# Patient Record
Sex: MALE | URBAN - METROPOLITAN AREA
[De-identification: names, ages, dates, MRNs, and addresses within clinical notes are randomized per-mention and may not be internally consistent; named-entity substitution may affect disease eponyms.]

---

## 2023-03-08 ENCOUNTER — OFFICE VISIT (OUTPATIENT)
Dept: URBAN - METROPOLITAN AREA CLINIC 113 | Facility: CLINIC | Age: 48
End: 2023-03-08

## 2023-03-08 NOTE — HPI-TODAY'S VISIT:
47-year-old male is referred by Dr. Anton Santiago for "GI work-up."  A copy of today's visit will be forwarded to the referring provider. Per the referral note, he does have possible perirectal abscesses.  Drains had been placed by Dr. Webster.  Differential includes cutaneous Crohn's disease versus hidradenitis suppurativa.

## 2023-04-17 ENCOUNTER — DASHBOARD ENCOUNTERS (OUTPATIENT)
Age: 48
End: 2023-04-17

## 2023-04-17 ENCOUNTER — OFFICE VISIT (OUTPATIENT)
Dept: URBAN - METROPOLITAN AREA CLINIC 113 | Facility: CLINIC | Age: 48
End: 2023-04-17

## 2025-02-19 ENCOUNTER — CLAIMS CREATED FROM THE CLAIM WINDOW (OUTPATIENT)
Dept: URBAN - METROPOLITAN AREA MEDICAL CENTER 19 | Facility: MEDICAL CENTER | Age: 50
End: 2025-02-19
Payer: COMMERCIAL

## 2025-02-19 ENCOUNTER — TELEPHONE ENCOUNTER (OUTPATIENT)
Dept: URBAN - METROPOLITAN AREA CLINIC 113 | Facility: CLINIC | Age: 50
End: 2025-02-19

## 2025-02-19 DIAGNOSIS — D50.9 ANEMIA: ICD-10-CM

## 2025-02-19 DIAGNOSIS — K50.114 ABSCESS OF INTESTINE DUE TO CROHN'S DISEASE OF LARGE INTESTINE: ICD-10-CM

## 2025-02-19 DIAGNOSIS — E46 MALNUTRITION, UNSPECIFIED TYPE: ICD-10-CM

## 2025-02-19 DIAGNOSIS — R63.4 ABNORMAL INTENTIONAL WEIGHT LOSS: ICD-10-CM

## 2025-02-19 PROCEDURE — 99222 1ST HOSP IP/OBS MODERATE 55: CPT | Performed by: INTERNAL MEDICINE

## 2025-02-19 PROCEDURE — 99254 IP/OBS CNSLTJ NEW/EST MOD 60: CPT | Performed by: INTERNAL MEDICINE

## 2025-02-20 ENCOUNTER — CLAIMS CREATED FROM THE CLAIM WINDOW (OUTPATIENT)
Dept: URBAN - METROPOLITAN AREA MEDICAL CENTER 19 | Facility: MEDICAL CENTER | Age: 50
End: 2025-02-20
Payer: COMMERCIAL

## 2025-02-20 DIAGNOSIS — K50.114 ABSCESS OF INTESTINE DUE TO CROHN'S DISEASE OF LARGE INTESTINE: ICD-10-CM

## 2025-02-20 DIAGNOSIS — D50.9 ANEMIA: ICD-10-CM

## 2025-02-20 DIAGNOSIS — R63.4 ABNORMAL INTENTIONAL WEIGHT LOSS: ICD-10-CM

## 2025-02-20 DIAGNOSIS — E46 MALNUTRITION, UNSPECIFIED TYPE: ICD-10-CM

## 2025-02-20 PROCEDURE — 99231 SBSQ HOSP IP/OBS SF/LOW 25: CPT | Performed by: INTERNAL MEDICINE

## 2025-02-21 ENCOUNTER — CLAIMS CREATED FROM THE CLAIM WINDOW (OUTPATIENT)
Dept: URBAN - METROPOLITAN AREA MEDICAL CENTER 19 | Facility: MEDICAL CENTER | Age: 50
End: 2025-02-21
Payer: COMMERCIAL

## 2025-02-21 DIAGNOSIS — D50.9 ANEMIA: ICD-10-CM

## 2025-02-21 DIAGNOSIS — K50.814 ABSCESS OF INTESTINE DUE TO CROHN'S DISEASE OF SMALL AND LARGE INTESTINES: ICD-10-CM

## 2025-02-21 DIAGNOSIS — R63.4 ABNORMAL INTENTIONAL WEIGHT LOSS: ICD-10-CM

## 2025-02-21 DIAGNOSIS — D68.8 AFIBRINOGENEMIA: ICD-10-CM

## 2025-02-21 PROCEDURE — 99232 SBSQ HOSP IP/OBS MODERATE 35: CPT | Performed by: INTERNAL MEDICINE

## 2025-02-24 ENCOUNTER — CLAIMS CREATED FROM THE CLAIM WINDOW (OUTPATIENT)
Dept: URBAN - METROPOLITAN AREA MEDICAL CENTER 19 | Facility: MEDICAL CENTER | Age: 50
End: 2025-02-24
Payer: COMMERCIAL

## 2025-02-24 DIAGNOSIS — K50.814 ABSCESS OF INTESTINE DUE TO CROHN'S DISEASE OF SMALL AND LARGE INTESTINES: ICD-10-CM

## 2025-02-24 DIAGNOSIS — D68.8 AFIBRINOGENEMIA: ICD-10-CM

## 2025-02-24 DIAGNOSIS — D50.9 ANEMIA: ICD-10-CM

## 2025-02-24 DIAGNOSIS — R63.4 ABNORMAL INTENTIONAL WEIGHT LOSS: ICD-10-CM

## 2025-02-24 PROCEDURE — 99232 SBSQ HOSP IP/OBS MODERATE 35: CPT | Performed by: INTERNAL MEDICINE

## 2025-02-25 ENCOUNTER — TELEPHONE ENCOUNTER (OUTPATIENT)
Dept: URBAN - METROPOLITAN AREA CLINIC 113 | Facility: CLINIC | Age: 50
End: 2025-02-25

## 2025-02-25 ENCOUNTER — CLAIMS CREATED FROM THE CLAIM WINDOW (OUTPATIENT)
Dept: URBAN - METROPOLITAN AREA MEDICAL CENTER 19 | Facility: MEDICAL CENTER | Age: 50
End: 2025-02-25
Payer: COMMERCIAL

## 2025-02-25 DIAGNOSIS — K31.89 OTHER DISEASES OF STOMACH AND DUODENUM: ICD-10-CM

## 2025-02-25 DIAGNOSIS — Z93.1 FEEDING BY G-TUBE: ICD-10-CM

## 2025-02-25 DIAGNOSIS — K50.812 CROHN'S DISEASE OF BOTH SMALL AND LARGE INTESTINE WITH INTESTINAL OBSTRUCTION: ICD-10-CM

## 2025-02-25 DIAGNOSIS — K22.2 ACQUIRED ESOPHAGEAL RING: ICD-10-CM

## 2025-02-25 PROCEDURE — 45331 SIGMOIDOSCOPY AND BIOPSY: CPT | Performed by: INTERNAL MEDICINE

## 2025-02-25 PROCEDURE — 44382 SMALL BOWEL ENDOSCOPY: CPT | Performed by: INTERNAL MEDICINE

## 2025-02-25 PROCEDURE — 43239 EGD BIOPSY SINGLE/MULTIPLE: CPT | Performed by: INTERNAL MEDICINE

## 2025-03-04 ENCOUNTER — TELEPHONE ENCOUNTER (OUTPATIENT)
Dept: URBAN - METROPOLITAN AREA CLINIC 113 | Facility: CLINIC | Age: 50
End: 2025-03-04

## 2025-03-11 ENCOUNTER — OFFICE VISIT (OUTPATIENT)
Dept: URBAN - METROPOLITAN AREA TELEHEALTH 2 | Facility: TELEHEALTH | Age: 50
End: 2025-03-11
Payer: COMMERCIAL

## 2025-03-11 VITALS — WEIGHT: 105 LBS | HEIGHT: 60 IN | BODY MASS INDEX: 20.62 KG/M2

## 2025-03-11 DIAGNOSIS — K50.10: ICD-10-CM

## 2025-03-11 DIAGNOSIS — K61.1 RECTAL ABSCESS: ICD-10-CM

## 2025-03-11 DIAGNOSIS — R63.4 WEIGHT LOSS: ICD-10-CM

## 2025-03-11 DIAGNOSIS — Z93.1: ICD-10-CM

## 2025-03-11 PROCEDURE — 99213 OFFICE O/P EST LOW 20 MIN: CPT

## 2025-03-11 NOTE — HPI-TODAY'S VISIT:
Mr. Bach is a 49-year-old gentleman with a history of prior alcohol use disorder presenting for follow-up after recent hospitalization.  He was admitted to [facility] on 2025 for evaluation of protein calorie malnutrition and severe perianal Crohn's disease complicated by perianal abscesses.  He had G-tube placement , bilateral thigh abscess status post UA/I&D 2024 and perianal Crohn's disease status post diverting ostomy in 2024.  He follows with Dr. Renee in the outpatient setting for right thigh wound she also placed his PEG tube.  Per the patient his perianal abscesses started initially 3 years ago and he was getting recurrently drained by his local surgeon Dr. Turner in [facility].  2 years later he was seen by GI DrLuh In [facility] where he underwent upper and lower endoscopy and diagnosed with Crohn's disease.  Patient was started on meter after the diagnosis of Crohn's was made.  He was on Humira for 10 weeks and stopped receiving the prescription in the mail and was unsure of why it was discontinued.  There seem to be some issues with follow-up with GI doctor in [facility]. It appears records were requested from Dr. Sharif's office, [facility], however these were not received.  He underwent EUA with abscess drainage and thigh abscess drainage on 2025.  This went well.  He underwent EGD and ileoscopy on  which did not reveal any additional findings.  Biopsies were negative.  On  patient was doing well, tolerating diet as well as tube feeds.  He was discharged home with recommendation for follow-up with Dr. Bills in 2 weeks and GI and 4 weeks.  Today he reports he is feeling much better. He was seen at Dr. Rosales office today and reports his weight was up to 105 pounds. He has regular output in ostomy, denies hematochezia or melena. He denies abdominal pain, nausea, or vomiting. He is unsure what medications he is currently taking.   Patient seen today via telehealth by agreement and consent of patient. I used a telephone call during the visit. The patient encounter is appropriate and reasonable under the circumstances given the patient's particular presentation at this time. The patient has been advised of the followin) the potential risks and limitations of this mode of treatment (including but not limited to the absence of in-person examination); 2) the right to refuse telehealth services at any point without affecting the right to future care; 3) the right to receive in-person services, included immediately after this consultation if an urgent need arises; 4) information, including identifiable images or information from this telehealth consult, will only be shared in accordance with HIPPA regulations. Any and all of the patient's and/or patient's family member's questions on this issue have been answered. call taken at home, approximately 10 minutes on phone with patient.

## 2025-03-16 ENCOUNTER — TELEPHONE ENCOUNTER (OUTPATIENT)
Dept: URBAN - METROPOLITAN AREA CLINIC 113 | Facility: CLINIC | Age: 50
End: 2025-03-16

## 2025-04-04 ENCOUNTER — TELEPHONE ENCOUNTER (OUTPATIENT)
Dept: URBAN - METROPOLITAN AREA TELEHEALTH 2 | Facility: TELEHEALTH | Age: 50
End: 2025-04-04

## 2025-05-13 ENCOUNTER — OFFICE VISIT (OUTPATIENT)
Dept: URBAN - METROPOLITAN AREA CLINIC 107 | Facility: CLINIC | Age: 50
End: 2025-05-13
Payer: COMMERCIAL

## 2025-05-13 DIAGNOSIS — K50.10: ICD-10-CM

## 2025-05-13 DIAGNOSIS — E43 SEVERE PROTEIN-CALORIE MALNUTRITION: ICD-10-CM

## 2025-05-13 PROCEDURE — 99213 OFFICE O/P EST LOW 20 MIN: CPT | Performed by: INTERNAL MEDICINE

## 2025-05-13 RX ORDER — METRONIDAZOLE 500 MG/1
1 TABLET TABLET ORAL THREE TIMES A DAY
Status: ACTIVE | COMMUNITY

## 2025-05-13 RX ORDER — LEVOFLOXACIN 750 MG/1
1 TABLET TABLET, FILM COATED ORAL ONCE A DAY
Status: ACTIVE | COMMUNITY

## 2025-05-13 NOTE — HPI-TODAY'S VISIT:
Mr. Bach is a 49-year-old gentleman with a history of prior alcohol use disorder, severe perianal Crohn's disease complicated by perianal abscesses s/p diverting colostomy,G-J tube for malnutrition on tube feedings presenting for follow-up after hospitalization.  He is currently not on any medical therapy for his Crohn's disease.  His previous psoas abscess is apparently resolving/resolved.  He has been regaining weight with 15 pound weight gain over the past few months.  He has both eating and completing tube feedings of Jevity 1.5 via the J-tube for 12 hours.  His ostomy output is fairly regular and brown in color.  He denies any significant abdominal pain but does have right groin pain with movement.  Pain continues to improve.  He was previously on Humira but apparently only received the induction dose (no maintenance therapy)

## 2025-05-13 NOTE — HPI-OTHER HISTORIES
Flexible sigmoidoscopy (2/25/2025, Dr. Renee) anal canal stenosis on perianal exam, decreased vascular pattern in the rectum and sigmoid colon status post biopsy

## 2025-05-29 PROBLEM — 238107002: Status: ACTIVE | Noted: 2025-05-29

## 2025-08-20 ENCOUNTER — OFFICE VISIT (OUTPATIENT)
Dept: URBAN - METROPOLITAN AREA CLINIC 107 | Facility: CLINIC | Age: 50
End: 2025-08-20
Payer: COMMERCIAL

## 2025-08-20 DIAGNOSIS — K50.10: ICD-10-CM

## 2025-08-20 DIAGNOSIS — Z78.9 ENCOUNTER FOR GASTROJEJUNAL (GJ) TUBE PLACEMENT: ICD-10-CM

## 2025-08-20 DIAGNOSIS — E43 SEVERE PROTEIN-CALORIE MALNUTRITION: ICD-10-CM

## 2025-08-20 PROCEDURE — 99214 OFFICE O/P EST MOD 30 MIN: CPT | Performed by: NURSE PRACTITIONER

## 2025-08-20 RX ORDER — LEVOFLOXACIN 750 MG/1
1 TABLET TABLET, FILM COATED ORAL ONCE A DAY
Status: ACTIVE | COMMUNITY

## 2025-08-20 RX ORDER — METRONIDAZOLE 500 MG/1
1 TABLET TABLET ORAL THREE TIMES A DAY
Status: ACTIVE | COMMUNITY

## 2025-08-21 ENCOUNTER — TELEPHONE ENCOUNTER (OUTPATIENT)
Dept: URBAN - METROPOLITAN AREA CLINIC 107 | Facility: CLINIC | Age: 50
End: 2025-08-21

## 2025-08-26 ENCOUNTER — TELEPHONE ENCOUNTER (OUTPATIENT)
Dept: URBAN - METROPOLITAN AREA CLINIC 107 | Facility: CLINIC | Age: 50
End: 2025-08-26